# Patient Record
Sex: FEMALE | Race: WHITE | NOT HISPANIC OR LATINO | URBAN - METROPOLITAN AREA
[De-identification: names, ages, dates, MRNs, and addresses within clinical notes are randomized per-mention and may not be internally consistent; named-entity substitution may affect disease eponyms.]

---

## 2019-09-24 ENCOUNTER — INPATIENT (INPATIENT)
Facility: HOSPITAL | Age: 29
LOS: 2 days | Discharge: HOME | End: 2019-09-27
Attending: SPECIALIST | Admitting: SPECIALIST

## 2019-09-24 VITALS
HEART RATE: 84 BPM | DIASTOLIC BLOOD PRESSURE: 65 MMHG | RESPIRATION RATE: 16 BRPM | TEMPERATURE: 98 F | SYSTOLIC BLOOD PRESSURE: 116 MMHG

## 2019-09-24 DIAGNOSIS — O26.899 OTHER SPECIFIED PREGNANCY RELATED CONDITIONS, UNSPECIFIED TRIMESTER: ICD-10-CM

## 2019-09-24 DIAGNOSIS — Z3A.00 WEEKS OF GESTATION OF PREGNANCY NOT SPECIFIED: ICD-10-CM

## 2019-09-25 ENCOUNTER — TRANSCRIPTION ENCOUNTER (OUTPATIENT)
Age: 29
End: 2019-09-25

## 2019-09-25 DIAGNOSIS — O41.00X0 OLIGOHYDRAMNIOS, UNSPECIFIED TRIMESTER, NOT APPLICABLE OR UNSPECIFIED: ICD-10-CM

## 2019-09-25 LAB
BASOPHILS # BLD AUTO: 0.06 K/UL — SIGNIFICANT CHANGE UP (ref 0–0.2)
BASOPHILS NFR BLD AUTO: 0.5 % — SIGNIFICANT CHANGE UP (ref 0–2)
BLD GP AB SCN SERPL QL: NEGATIVE — SIGNIFICANT CHANGE UP
EOSINOPHIL # BLD AUTO: 0.38 K/UL — SIGNIFICANT CHANGE UP (ref 0–0.5)
EOSINOPHIL NFR BLD AUTO: 3.3 % — SIGNIFICANT CHANGE UP (ref 0–6)
HCT VFR BLD CALC: 40.3 % — SIGNIFICANT CHANGE UP (ref 34.5–45)
HGB BLD-MCNC: 13 G/DL — SIGNIFICANT CHANGE UP (ref 11.5–15.5)
IMM GRANULOCYTES NFR BLD AUTO: 1.8 % — HIGH (ref 0–1.5)
LYMPHOCYTES # BLD AUTO: 2.33 K/UL — SIGNIFICANT CHANGE UP (ref 1–3.3)
LYMPHOCYTES # BLD AUTO: 20.5 % — SIGNIFICANT CHANGE UP (ref 13–44)
MCHC RBC-ENTMCNC: 31.2 PG — SIGNIFICANT CHANGE UP (ref 27–34)
MCHC RBC-ENTMCNC: 32.3 % — SIGNIFICANT CHANGE UP (ref 32–36)
MCV RBC AUTO: 96.6 FL — SIGNIFICANT CHANGE UP (ref 80–100)
MONOCYTES # BLD AUTO: 0.65 K/UL — SIGNIFICANT CHANGE UP (ref 0–0.9)
MONOCYTES NFR BLD AUTO: 5.7 % — SIGNIFICANT CHANGE UP (ref 2–14)
NEUTROPHILS # BLD AUTO: 7.76 K/UL — HIGH (ref 1.8–7.4)
NEUTROPHILS NFR BLD AUTO: 68.2 % — SIGNIFICANT CHANGE UP (ref 43–77)
NRBC # FLD: 0 K/UL — SIGNIFICANT CHANGE UP (ref 0–0)
PLATELET # BLD AUTO: 152 K/UL — SIGNIFICANT CHANGE UP (ref 150–400)
PMV BLD: 12.2 FL — SIGNIFICANT CHANGE UP (ref 7–13)
RBC # BLD: 4.17 M/UL — SIGNIFICANT CHANGE UP (ref 3.8–5.2)
RBC # FLD: 13.4 % — SIGNIFICANT CHANGE UP (ref 10.3–14.5)
RH IG SCN BLD-IMP: POSITIVE — SIGNIFICANT CHANGE UP
RH IG SCN BLD-IMP: POSITIVE — SIGNIFICANT CHANGE UP
T PALLIDUM AB TITR SER: NEGATIVE — SIGNIFICANT CHANGE UP
WBC # BLD: 11.38 K/UL — HIGH (ref 3.8–10.5)
WBC # FLD AUTO: 11.38 K/UL — HIGH (ref 3.8–10.5)

## 2019-09-25 RX ORDER — OXYTOCIN 10 UNIT/ML
333.33 VIAL (ML) INJECTION
Qty: 20 | Refills: 0 | Status: DISCONTINUED | OUTPATIENT
Start: 2019-09-25 | End: 2019-09-25

## 2019-09-25 RX ORDER — AER TRAVELER 0.5 G/1
1 SOLUTION RECTAL; TOPICAL EVERY 4 HOURS
Refills: 0 | Status: DISCONTINUED | OUTPATIENT
Start: 2019-09-25 | End: 2019-09-27

## 2019-09-25 RX ORDER — GLYCERIN ADULT
1 SUPPOSITORY, RECTAL RECTAL AT BEDTIME
Refills: 0 | Status: DISCONTINUED | OUTPATIENT
Start: 2019-09-25 | End: 2019-09-27

## 2019-09-25 RX ORDER — OXYCODONE HYDROCHLORIDE 5 MG/1
5 TABLET ORAL
Refills: 0 | Status: DISCONTINUED | OUTPATIENT
Start: 2019-09-25 | End: 2019-09-27

## 2019-09-25 RX ORDER — SODIUM CHLORIDE 9 MG/ML
1000 INJECTION, SOLUTION INTRAVENOUS
Refills: 0 | Status: DISCONTINUED | OUTPATIENT
Start: 2019-09-25 | End: 2019-09-25

## 2019-09-25 RX ORDER — LANOLIN
1 OINTMENT (GRAM) TOPICAL EVERY 6 HOURS
Refills: 0 | Status: DISCONTINUED | OUTPATIENT
Start: 2019-09-25 | End: 2019-09-27

## 2019-09-25 RX ORDER — OXYCODONE HYDROCHLORIDE 5 MG/1
5 TABLET ORAL ONCE
Refills: 0 | Status: DISCONTINUED | OUTPATIENT
Start: 2019-09-25 | End: 2019-09-27

## 2019-09-25 RX ORDER — TETANUS TOXOID, REDUCED DIPHTHERIA TOXOID AND ACELLULAR PERTUSSIS VACCINE, ADSORBED 5; 2.5; 8; 8; 2.5 [IU]/.5ML; [IU]/.5ML; UG/.5ML; UG/.5ML; UG/.5ML
0.5 SUSPENSION INTRAMUSCULAR ONCE
Refills: 0 | Status: DISCONTINUED | OUTPATIENT
Start: 2019-09-25 | End: 2019-09-27

## 2019-09-25 RX ORDER — OXYTOCIN 10 UNIT/ML
2 VIAL (ML) INJECTION
Qty: 30 | Refills: 0 | Status: DISCONTINUED | OUTPATIENT
Start: 2019-09-25 | End: 2019-09-25

## 2019-09-25 RX ORDER — IBUPROFEN 200 MG
600 TABLET ORAL EVERY 6 HOURS
Refills: 0 | Status: COMPLETED | OUTPATIENT
Start: 2019-09-25 | End: 2020-08-23

## 2019-09-25 RX ORDER — PRAMOXINE HYDROCHLORIDE 150 MG/15G
1 AEROSOL, FOAM RECTAL EVERY 4 HOURS
Refills: 0 | Status: DISCONTINUED | OUTPATIENT
Start: 2019-09-25 | End: 2019-09-27

## 2019-09-25 RX ORDER — KETOROLAC TROMETHAMINE 30 MG/ML
30 SYRINGE (ML) INJECTION ONCE
Refills: 0 | Status: DISCONTINUED | OUTPATIENT
Start: 2019-09-25 | End: 2019-09-25

## 2019-09-25 RX ORDER — SIMETHICONE 80 MG/1
80 TABLET, CHEWABLE ORAL EVERY 4 HOURS
Refills: 0 | Status: DISCONTINUED | OUTPATIENT
Start: 2019-09-25 | End: 2019-09-27

## 2019-09-25 RX ORDER — SODIUM CHLORIDE 9 MG/ML
3 INJECTION INTRAMUSCULAR; INTRAVENOUS; SUBCUTANEOUS EVERY 8 HOURS
Refills: 0 | Status: DISCONTINUED | OUTPATIENT
Start: 2019-09-25 | End: 2019-09-27

## 2019-09-25 RX ORDER — DOCUSATE SODIUM 100 MG
100 CAPSULE ORAL
Refills: 0 | Status: DISCONTINUED | OUTPATIENT
Start: 2019-09-25 | End: 2019-09-27

## 2019-09-25 RX ORDER — OXYTOCIN 10 UNIT/ML
333.33 VIAL (ML) INJECTION
Qty: 20 | Refills: 0 | Status: DISCONTINUED | OUTPATIENT
Start: 2019-09-25 | End: 2019-09-26

## 2019-09-25 RX ORDER — DIPHENHYDRAMINE HCL 50 MG
25 CAPSULE ORAL EVERY 6 HOURS
Refills: 0 | Status: DISCONTINUED | OUTPATIENT
Start: 2019-09-25 | End: 2019-09-27

## 2019-09-25 RX ORDER — MAGNESIUM HYDROXIDE 400 MG/1
30 TABLET, CHEWABLE ORAL
Refills: 0 | Status: DISCONTINUED | OUTPATIENT
Start: 2019-09-25 | End: 2019-09-27

## 2019-09-25 RX ORDER — BENZOCAINE 10 %
1 GEL (GRAM) MUCOUS MEMBRANE EVERY 6 HOURS
Refills: 0 | Status: DISCONTINUED | OUTPATIENT
Start: 2019-09-25 | End: 2019-09-27

## 2019-09-25 RX ORDER — IBUPROFEN 200 MG
600 TABLET ORAL EVERY 6 HOURS
Refills: 0 | Status: DISCONTINUED | OUTPATIENT
Start: 2019-09-25 | End: 2019-09-27

## 2019-09-25 RX ORDER — DIBUCAINE 1 %
1 OINTMENT (GRAM) RECTAL EVERY 6 HOURS
Refills: 0 | Status: DISCONTINUED | OUTPATIENT
Start: 2019-09-25 | End: 2019-09-27

## 2019-09-25 RX ORDER — INFLUENZA VIRUS VACCINE 15; 15; 15; 15 UG/.5ML; UG/.5ML; UG/.5ML; UG/.5ML
0.5 SUSPENSION INTRAMUSCULAR ONCE
Refills: 0 | Status: DISCONTINUED | OUTPATIENT
Start: 2019-09-25 | End: 2019-09-27

## 2019-09-25 RX ORDER — ACETAMINOPHEN 500 MG
975 TABLET ORAL
Refills: 0 | Status: DISCONTINUED | OUTPATIENT
Start: 2019-09-25 | End: 2019-09-27

## 2019-09-25 RX ORDER — HYDROCORTISONE 1 %
1 OINTMENT (GRAM) TOPICAL EVERY 6 HOURS
Refills: 0 | Status: DISCONTINUED | OUTPATIENT
Start: 2019-09-25 | End: 2019-09-27

## 2019-09-25 RX ADMIN — SODIUM CHLORIDE 3 MILLILITER(S): 9 INJECTION INTRAMUSCULAR; INTRAVENOUS; SUBCUTANEOUS at 20:16

## 2019-09-25 RX ADMIN — Medication 975 MILLIGRAM(S): at 22:00

## 2019-09-25 RX ADMIN — Medication 975 MILLIGRAM(S): at 21:35

## 2019-09-25 RX ADMIN — Medication 1000 MILLIUNIT(S)/MIN: at 16:28

## 2019-09-25 RX ADMIN — Medication 30 MILLIGRAM(S): at 16:25

## 2019-09-25 RX ADMIN — Medication 2 MILLIUNIT(S)/MIN: at 08:20

## 2019-09-25 RX ADMIN — SODIUM CHLORIDE 125 MILLILITER(S): 9 INJECTION, SOLUTION INTRAVENOUS at 01:30

## 2019-09-25 NOTE — DISCHARGE NOTE OB - CARE PROVIDER_API CALL
Spencer Braden)  Obstetrics and Gynecology  [address] [facility], [address] [place] [address]  Phone: [phone]  Fax: [phone]  Follow Up Time:

## 2019-09-25 NOTE — DISCHARGE NOTE OB - PATIENT PORTAL LINK FT
You can access the FollowMyHealth Patient Portal offered by [facility] by registering at the following website: [email or web address]. By joining [employer]’s FollowMyHealth portal, you will also be able to view your health information using other applications (apps) compatible with our system.

## 2019-09-25 NOTE — OB PROVIDER TRIAGE NOTE - HISTORY OF PRESENT ILLNESS
Patient is a 30 y/o  @ 39 2/7wks gestation who was sent in for IOL 2' oligohydramnios.    Patient also reports decreased fetal movements  x 2 hours.  Denies lof/vb.      Denies AP Complications  Meds - pnv  NKDA

## 2019-09-25 NOTE — DISCHARGE NOTE OB - CARE PLAN
Principal Discharge DX:	Normal spontaneous vaginal delivery  Goal:	routine  Assessment and plan of treatment:	routine

## 2019-09-25 NOTE — OB RN DELIVERY SUMMARY - NS_SEPSISRSKCALC_OBGYN_ALL_OB_FT
EOS calculated successfully. EOS Risk Factor: 0.5/1000 live births ([facility] national incidence); GA=39w2d; Temp=98.6; ROM=6.067; GBS='Negative'; Antibiotics='No antibiotics or any antibiotics < 2 hrs prior to birth'

## 2019-09-25 NOTE — DISCHARGE NOTE OB - MATERIALS PROVIDED
Immunization Record/Shaken Baby Prevention Handout/MMR Vaccination (VIS Pub Date: 2012)/[facility] Hearing Screen Program/Birth Certificate Instructions/Guide to Postpartum Care/[facility] [place] Screening Program/Tdap Vaccination (VIS Pub Date: 2012)

## 2019-09-25 NOTE — OB PROVIDER IHI INDUCTION/AUGMENTATION NOTE - NS_CHECKALL_OBGYN_ALL_OB
Induction / Augmentation was discussed/FHR was reviewed/H&P was completed/Contractions pattern was reviewed/Order was written
Induction / Augmentation was discussed/Order was written/Contractions pattern was reviewed/FHR was reviewed/H&P was completed

## 2019-09-25 NOTE — OB PROVIDER TRIAGE NOTE - NSOBPROVIDERNOTE_OBGYN_ALL_OB_FT
Patient is a 30 y/o  @ 39 2/7wks gestation who was sent in for IOL 2' oligohydramnios.    Patient also reports decreased fetal movements  x 2 hours.  Denies lof/vb.      Denies AP Complications  Meds - pnv  NKDA       PMH/PSH/GYN/SH - denies  OB  - - 2014 - 6lbs 8oz  - - 2015 - 7lbs 8oz    VSS  Abdomen gravid, soft and nontender  Continue EFM  SVE - /-3 Intact  TAS - vtx/post plac/herminia 3.15  GBS negative  Clinical EFW 2948G    Discussed patient with Dr Braden.  Plan:  admit to l&d for management.  For PO Cytotec.

## 2019-09-25 NOTE — OB PROVIDER TRIAGE NOTE - NSHPPHYSICALEXAM_GEN_ALL_CORE
VSS  Abdomen gravid, soft and nontender  Continue EFM  SVE - 2/70/-3 Intact  TAS - vtx/post plac/herminia 3.15  GBS negative  Clinical EFW 2948G

## 2019-09-25 NOTE — OB PROVIDER TRIAGE NOTE - NS_PARA_OBGYN_ALL_OB_NU
2
Pt presents to the ED c/o dizziness x 3 days. Pt states she feels " shes in a fog." Pt has also had diarrhea. Lives at home with daughter. On 2L o2 at home. Hx: copd, chf, pacemaker.

## 2019-09-25 NOTE — OB PROVIDER H&P - ASSESSMENT
Patient is a 28 y/o  @ 39 2/7wks gestation, for IOL 2' oligohydramnios.     GBS negative  PO Cytotec.

## 2019-09-26 RX ORDER — IBUPROFEN 200 MG
1 TABLET ORAL
Qty: 0 | Refills: 0 | DISCHARGE
Start: 2019-09-26

## 2019-09-26 RX ORDER — ACETAMINOPHEN 500 MG
3 TABLET ORAL
Qty: 0 | Refills: 0 | DISCHARGE
Start: 2019-09-26

## 2019-09-26 RX ADMIN — Medication 600 MILLIGRAM(S): at 01:21

## 2019-09-26 RX ADMIN — Medication 600 MILLIGRAM(S): at 08:05

## 2019-09-26 RX ADMIN — Medication 600 MILLIGRAM(S): at 19:57

## 2019-09-26 RX ADMIN — Medication 600 MILLIGRAM(S): at 20:35

## 2019-09-26 RX ADMIN — Medication 975 MILLIGRAM(S): at 12:49

## 2019-09-26 RX ADMIN — Medication 975 MILLIGRAM(S): at 18:17

## 2019-09-26 RX ADMIN — Medication 600 MILLIGRAM(S): at 08:46

## 2019-09-26 RX ADMIN — Medication 975 MILLIGRAM(S): at 17:47

## 2019-09-26 RX ADMIN — Medication 600 MILLIGRAM(S): at 14:10

## 2019-09-26 RX ADMIN — Medication 600 MILLIGRAM(S): at 00:34

## 2019-09-26 RX ADMIN — Medication 975 MILLIGRAM(S): at 05:27

## 2019-09-26 RX ADMIN — Medication 975 MILLIGRAM(S): at 12:19

## 2019-09-26 RX ADMIN — Medication 1 TABLET(S): at 12:18

## 2019-09-26 RX ADMIN — SODIUM CHLORIDE 3 MILLILITER(S): 9 INJECTION INTRAMUSCULAR; INTRAVENOUS; SUBCUTANEOUS at 08:05

## 2019-09-26 RX ADMIN — Medication 600 MILLIGRAM(S): at 13:35

## 2019-09-26 NOTE — PROGRESS NOTE ADULT - SUBJECTIVE AND OBJECTIVE BOX
Anesthesia Post-op Note    s/p labor analgesia    Patient is doing well.  OOBAA. Tolerating clears.  Pain is tolerable.  No residual anesthetic issues or complications noted.    Vital Signs Last 24 Hrs  T(C): 36.4 (26 Sep 2019 05:52), Max: 36.9 (25 Sep 2019 15:58)  T(F): 97.6 (26 Sep 2019 05:52), Max: 98.4 (25 Sep 2019 15:58)  HR: 70 (26 Sep 2019 05:52) (70 - 126)  BP: 109/72 (26 Sep 2019 05:52) (93/54 - 163/72)  BP(mean): --  RR: 17 (26 Sep 2019 05:52) (16 - 17)  SpO2: 98% (26 Sep 2019 05:52) (95% - 100%)

## 2019-09-27 VITALS
RESPIRATION RATE: 18 BRPM | OXYGEN SATURATION: 99 % | TEMPERATURE: 98 F | HEART RATE: 70 BPM | DIASTOLIC BLOOD PRESSURE: 70 MMHG | SYSTOLIC BLOOD PRESSURE: 115 MMHG

## 2019-09-27 RX ADMIN — Medication 975 MILLIGRAM(S): at 06:26

## 2019-09-27 RX ADMIN — Medication 600 MILLIGRAM(S): at 04:19

## 2019-09-27 RX ADMIN — Medication 600 MILLIGRAM(S): at 10:07

## 2019-09-27 RX ADMIN — Medication 100 MILLIGRAM(S): at 05:58

## 2019-09-27 RX ADMIN — Medication 600 MILLIGRAM(S): at 04:50

## 2019-09-27 RX ADMIN — Medication 975 MILLIGRAM(S): at 05:59

## 2019-09-27 RX ADMIN — Medication 600 MILLIGRAM(S): at 10:40
